# Patient Record
Sex: FEMALE
[De-identification: names, ages, dates, MRNs, and addresses within clinical notes are randomized per-mention and may not be internally consistent; named-entity substitution may affect disease eponyms.]

---

## 2022-07-22 PROBLEM — Z00.00 ENCOUNTER FOR PREVENTIVE HEALTH EXAMINATION: Status: ACTIVE | Noted: 2022-07-22

## 2022-10-31 ENCOUNTER — APPOINTMENT (OUTPATIENT)
Dept: RHEUMATOLOGY | Facility: CLINIC | Age: 52
End: 2022-10-31

## 2022-10-31 ENCOUNTER — LABORATORY RESULT (OUTPATIENT)
Age: 52
End: 2022-10-31

## 2022-10-31 ENCOUNTER — NON-APPOINTMENT (OUTPATIENT)
Age: 52
End: 2022-10-31

## 2022-10-31 VITALS
HEART RATE: 78 BPM | OXYGEN SATURATION: 96 % | TEMPERATURE: 97.6 F | SYSTOLIC BLOOD PRESSURE: 140 MMHG | DIASTOLIC BLOOD PRESSURE: 70 MMHG

## 2022-10-31 DIAGNOSIS — Z82.61 FAMILY HISTORY OF ARTHRITIS: ICD-10-CM

## 2022-10-31 DIAGNOSIS — Z63.5 DISRUPTION OF FAMILY BY SEPARATION AND DIVORCE: ICD-10-CM

## 2022-10-31 DIAGNOSIS — Z82.49 FAMILY HISTORY OF ISCHEMIC HEART DISEASE AND OTHER DISEASES OF THE CIRCULATORY SYSTEM: ICD-10-CM

## 2022-10-31 DIAGNOSIS — Z87.2 PERSONAL HISTORY OF DISEASES OF THE SKIN AND SUBCUTANEOUS TISSUE: ICD-10-CM
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
## 2022-10-31 DIAGNOSIS — Z78.9 OTHER SPECIFIED HEALTH STATUS: ICD-10-CM

## 2022-10-31 DIAGNOSIS — M19.90 UNSPECIFIED OSTEOARTHRITIS, UNSPECIFIED SITE: ICD-10-CM

## 2022-10-31 DIAGNOSIS — M25.50 PAIN IN UNSPECIFIED JOINT: ICD-10-CM

## 2022-10-31 DIAGNOSIS — Z86.69 PERSONAL HISTORY OF OTHER DISEASES OF THE NERVOUS SYSTEM AND SENSE ORGANS: ICD-10-CM

## 2022-10-31 DIAGNOSIS — Z83.3 FAMILY HISTORY OF DIABETES MELLITUS: ICD-10-CM

## 2022-10-31 DIAGNOSIS — Z80.0 FAMILY HISTORY OF MALIGNANT NEOPLASM OF DIGESTIVE ORGANS: ICD-10-CM

## 2022-10-31 PROCEDURE — 99205 OFFICE O/P NEW HI 60 MIN: CPT

## 2022-10-31 RX ORDER — CICLOPIROX OLAMINE 7.7 MG/ML
0.77 SUSPENSION TOPICAL
Qty: 60 | Refills: 0 | Status: DISCONTINUED | COMMUNITY
Start: 2022-04-01

## 2022-10-31 RX ORDER — METRONIDAZOLE 7.5 MG/G
0.75 CREAM TOPICAL
Qty: 45 | Refills: 0 | Status: DISCONTINUED | COMMUNITY
Start: 2022-04-01

## 2022-10-31 RX ORDER — TRIAMCINOLONE ACETONIDE 1 MG/G
0.1 CREAM TOPICAL
Qty: 80 | Refills: 0 | Status: ACTIVE | COMMUNITY
Start: 2022-04-01

## 2022-10-31 RX ORDER — FLUOCINONIDE 0.5 MG/ML
0.05 SOLUTION TOPICAL
Qty: 60 | Refills: 0 | Status: DISCONTINUED | COMMUNITY
Start: 2022-04-01

## 2022-10-31 RX ORDER — METHYLPREDNISOLONE 4 MG/1
4 TABLET ORAL
Qty: 21 | Refills: 0 | Status: DISCONTINUED | COMMUNITY
Start: 2022-06-30

## 2022-10-31 RX ORDER — TRIAMCINOLONE ACETONIDE 1 MG/G
0.1 OINTMENT TOPICAL
Refills: 0 | Status: DISCONTINUED | COMMUNITY

## 2022-10-31 RX ORDER — METRONIDAZOLE 375 MG/1
375 CAPSULE ORAL
Qty: 30 | Refills: 0 | Status: DISCONTINUED | COMMUNITY
Start: 2022-05-18

## 2022-10-31 RX ORDER — FLUCONAZOLE 150 MG/1
150 TABLET ORAL
Qty: 3 | Refills: 0 | Status: DISCONTINUED | COMMUNITY
Start: 2022-05-31

## 2022-10-31 RX ORDER — BUTALBITAL, ACETAMINOPHEN, AND CAFFEINE 50; 300; 40 MG/1; MG/1; MG/1
CAPSULE ORAL
Refills: 0 | Status: DISCONTINUED | COMMUNITY

## 2022-10-31 RX ORDER — TRAZODONE HYDROCHLORIDE 50 MG/1
50 TABLET ORAL
Qty: 30 | Refills: 0 | Status: DISCONTINUED | COMMUNITY
Start: 2022-10-20

## 2022-10-31 RX ORDER — AMOXICILLIN 500 MG/1
500 CAPSULE ORAL
Qty: 30 | Refills: 0 | Status: DISCONTINUED | COMMUNITY
Start: 2022-05-18

## 2022-10-31 SDOH — SOCIAL STABILITY - SOCIAL INSECURITY: DISRUPTION OF FAMILY BY SEPARATION AND DIVORCE: Z63.5

## 2022-11-01 ENCOUNTER — TRANSCRIPTION ENCOUNTER (OUTPATIENT)
Age: 52
End: 2022-11-01

## 2022-11-05 LAB
A PHAGOCYTOPH IGG TITR SER IF: NORMAL TITER
ACE BLD-CCNC: 53 U/L
ALBUMIN SERPL ELPH-MCNC: 5.3 G/DL
ALP BLD-CCNC: 82 U/L
ALT SERPL-CCNC: 27 U/L
ANA SER IF-ACNC: NEGATIVE
ANION GAP SERPL CALC-SCNC: 12 MMOL/L
APPEARANCE: CLEAR
AST SERPL-CCNC: 24 U/L
B BURGDOR AB SER QL IA: NEGATIVE
B MICROTI IGG TITR SER: NORMAL TITER
BACTERIA: NEGATIVE
BASOPHILS # BLD AUTO: 0.06 K/UL
BASOPHILS NFR BLD AUTO: 0.9 %
BILIRUB SERPL-MCNC: 0.2 MG/DL
BILIRUBIN URINE: NEGATIVE
BLOOD URINE: NEGATIVE
BUN SERPL-MCNC: 22 MG/DL
CALCIUM SERPL-MCNC: 10.1 MG/DL
CCP AB SER IA-ACNC: <8 UNITS
CHLORIDE SERPL-SCNC: 101 MMOL/L
CK SERPL-CCNC: 200 U/L
CO2 SERPL-SCNC: 26 MMOL/L
COLOR: NORMAL
CREAT SERPL-MCNC: 0.86 MG/DL
CRP SERPL-MCNC: <3 MG/L
DSDNA AB SER-ACNC: 25 IU/ML
E CHAFFEENSIS IGG TITR SER IF: NORMAL TITER
EGFR: 82 ML/MIN/1.73M2
ENA SS-A AB SER IA-ACNC: <0.2 AL
ENA SS-B AB SER IA-ACNC: <0.2 AL
ENDOMYSIUM IGA SER QL: NEGATIVE
ENDOMYSIUM IGA TITR SER: NORMAL
EOSINOPHIL # BLD AUTO: 0.26 K/UL
EOSINOPHIL NFR BLD AUTO: 3.7 %
ERYTHROCYTE [SEDIMENTATION RATE] IN BLOOD BY WESTERGREN METHOD: 3 MM/HR
GLIADIN IGA SER QL: <5 UNITS
GLIADIN IGG SER QL: <5 UNITS
GLIADIN PEPTIDE IGA SER-ACNC: NEGATIVE
GLIADIN PEPTIDE IGG SER-ACNC: NEGATIVE
GLUCOSE QUALITATIVE U: NEGATIVE
GLUCOSE SERPL-MCNC: 93 MG/DL
HAV IGM SER QL: NONREACTIVE
HBV CORE IGG+IGM SER QL: NONREACTIVE
HBV CORE IGM SER QL: NONREACTIVE
HBV SURFACE AG SER QL: NONREACTIVE
HCT VFR BLD CALC: 44.1 %
HCV AB SER QL: NONREACTIVE
HCV S/CO RATIO: 0.24 S/CO
HGB BLD-MCNC: 14.1 G/DL
HYALINE CASTS: 0 /LPF
IGG SUBSET TOTAL IGG: 1020 MG/DL
IGG1 SER-MCNC: 626 MG/DL
IGG2 SER-MCNC: 254 MG/DL
IGG3 SER-MCNC: 45 MG/DL
IGG4 SER-MCNC: 9 MG/DL
IMM GRANULOCYTES NFR BLD AUTO: 0.3 %
KETONES URINE: NEGATIVE
LDH SERPL-CCNC: 161 U/L
LEUKOCYTE ESTERASE URINE: NEGATIVE
LYMPHOCYTES # BLD AUTO: 1.54 K/UL
LYMPHOCYTES NFR BLD AUTO: 22 %
MAGNESIUM SERPL-MCNC: 2 MG/DL
MAN DIFF?: NORMAL
MCHC RBC-ENTMCNC: 31.3 PG
MCHC RBC-ENTMCNC: 32 GM/DL
MCV RBC AUTO: 98 FL
MICROSCOPIC-UA: NORMAL
MONOCYTES # BLD AUTO: 0.38 K/UL
MONOCYTES NFR BLD AUTO: 5.4 %
NEUTROPHILS # BLD AUTO: 4.74 K/UL
NEUTROPHILS NFR BLD AUTO: 67.7 %
NITRITE URINE: NEGATIVE
PH URINE: 6.5
PHOSPHATE SERPL-MCNC: 4.4 MG/DL
PLATELET # BLD AUTO: 235 K/UL
POTASSIUM SERPL-SCNC: 4.3 MMOL/L
PROT SERPL-MCNC: 7.4 G/DL
PROTEIN URINE: NEGATIVE
RBC # BLD: 4.5 M/UL
RBC # FLD: 12.4 %
RED BLOOD CELLS URINE: 1 /HPF
RF+CCP IGG SER-IMP: NEGATIVE
RHEUMATOID FACT SER QL: 17 IU/ML
SODIUM SERPL-SCNC: 139 MMOL/L
SPECIFIC GRAVITY URINE: 1.01
SQUAMOUS EPITHELIAL CELLS: 3 /HPF
T3 SERPL-MCNC: 72 NG/DL
T3RU NFR SERPL: 1 TBI
T4 SERPL-MCNC: 5.9 UG/DL
THYROGLOB AB SERPL-ACNC: <20 IU/ML
THYROPEROXIDASE AB SERPL IA-ACNC: <10 IU/ML
TSH SERPL-ACNC: 1.92 UIU/ML
TTG IGA SER IA-ACNC: 2 U/ML
TTG IGA SER-ACNC: NEGATIVE
TTG IGG SER IA-ACNC: <1.2 U/ML
TTG IGG SER IA-ACNC: NEGATIVE
URATE SERPL-MCNC: 6 MG/DL
UROBILINOGEN URINE: NORMAL
WBC # FLD AUTO: 7 K/UL
WHITE BLOOD CELLS URINE: 0 /HPF

## 2022-11-10 LAB
ALBUMIN MFR SERPL ELPH: 64.7 %
ALBUMIN SERPL-MCNC: 4.8 G/DL
ALBUMIN/GLOB SERPL: 1.8 RATIO
ALPHA1 GLOB MFR SERPL ELPH: 3.5 %
ALPHA1 GLOB SERPL ELPH-MCNC: 0.3 G/DL
ALPHA2 GLOB MFR SERPL ELPH: 8.3 %
ALPHA2 GLOB SERPL ELPH-MCNC: 0.6 G/DL
B-GLOBULIN MFR SERPL ELPH: 9.4 %
B-GLOBULIN SERPL ELPH-MCNC: 0.7 G/DL
DEPRECATED KAPPA LC FREE/LAMBDA SER: 1.39 RATIO
GAMMA GLOB FLD ELPH-MCNC: 1 G/DL
GAMMA GLOB MFR SERPL ELPH: 14.1 %
IGA SER QL IEP: 96 MG/DL
IGG SER QL IEP: 1023 MG/DL
IGM SER QL IEP: 195 MG/DL
INTERPRETATION SERPL IEP-IMP: NORMAL
KAPPA LC CSF-MCNC: 1.14 MG/DL
KAPPA LC SERPL-MCNC: 1.59 MG/DL
M PROTEIN SPEC IFE-MCNC: NORMAL
PROT SERPL-MCNC: 7.4 G/DL
PROT SERPL-MCNC: 7.4 G/DL

## 2022-11-13 ENCOUNTER — RX CHANGE (OUTPATIENT)
Age: 52
End: 2022-11-13

## 2022-11-14 PROBLEM — Z86.69 HISTORY OF MIGRAINE HEADACHES: Status: RESOLVED | Noted: 2022-11-14 | Resolved: 2022-11-14

## 2022-11-14 PROBLEM — Z87.2 HISTORY OF ECZEMA: Status: RESOLVED | Noted: 2022-11-14 | Resolved: 2022-11-14
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
## 2022-11-14 PROBLEM — Z78.9: Status: RESOLVED | Noted: 2022-11-14 | Resolved: 2022-11-14

## 2022-11-14 PROBLEM — Z63.5 DIVORCED: Status: ACTIVE | Noted: 2022-11-14

## 2022-11-14 PROBLEM — Z78.9 ALCOHOL USE: Status: ACTIVE | Noted: 2022-11-14

## 2022-11-15 NOTE — HISTORY OF PRESENT ILLNESS
[FreeTextEntry1] : DOUG HENDRICKS is a 52 year old  woman who was referred here for further evaluation of joint symptoms and rheumatic diseases.\par \par 3 months ago, patient had persistent low back pain with radiation to the left lower extremity to the toes with paresthesias with prolonged sitting, walking or when lying supine. She also had pain in bilateral shoulders, elbows, wrists, PIPs, hips, knees, ankles, balls of both feet, soles of both feet and dorsa both feet. Denies joint swelling, erythema and heat. Pain worse in the morning. Morning stiffness lasts all day. Denies trauma or injury. Pain management without diagnosis or treatment. Today, she has dry mouth but denies dry eyes, dry skin or vaginal dryness. Much sleep disturbance and fatigue with about 6-8 hours of sleep a night. 12 years history of similar symptoms. Pain management treated with multiple epidural steroid injections without much relief. 2013 - tick bite without rash but treated with antibiotics as a precaution. In the past, treated Meloxicam 15 mg q.d. without relief. Flexeril 10 mg or 20 mg q.d. h.s. without relief and side effects (grogginess and dryness). Mother and father with arthritis of unknown type. Pain Management Dr. Sommers referred patient here for further evaluation.

## 2022-11-15 NOTE — ADDENDUM
[FreeTextEntry1] : I, Yany Chacon, acted solely as a scribe for Dr. Myron I. Kleiner, MD. on 10/31/2022.

## 2022-11-15 NOTE — REVIEW OF SYSTEMS
[Negative] : Heme/Lymph [Feeling Tired] : feeling tired [Arthralgias] : arthralgias [Joint Pain] : joint pain [Joint Stiffness] : joint stiffness [As Noted in HPI] : as noted in HPI [Dry Eyes] : no dryness of the eyes

## 2022-11-15 NOTE — CONSULT LETTER
[Consult Letter:] : I had the pleasure of evaluating your patient, [unfilled]. [Please see my note below.] : Please see my note below. [Consult Closing:] : Thank you very much for allowing me to participate in the care of this patient.  If you have any questions, please do not hesitate to contact me. [Sincerely,] : Sincerely, [DrDesi  ___] : Dr. LIN [Dear  ___] : Dear  [unfilled], [FreeTextEntry3] : Ankit\par Myron I. Kleiner, M.D., FACR\par Chief, Division of Rheumatology\par Department of Medicine\par [facility]

## 2022-11-15 NOTE — ASSESSMENT
[FreeTextEntry1] : Impression: DOUG HENDRICKS is a 52 year old  woman who was referred here for further evaluation of joint symptoms and rheumatic diseases.\par \par 3 month history of persistent low back pain with radiation to the left lower extremity to the toes with paresthesias. I will evaluate for various types of rheumatic diseases, including various inflammatory types of arthritis. She also had persistent arthralgias in other various joints. Today, she has dry eyes, although not bothersome and dry mouth but denies dry skin or vaginal dryness - consider Sjogren's Syndrome and evaluate for sarcoidosis, hepatitis C, IgG 4 related disease and other related diseases. Much sleep disturbance and fatigue secondary to fibromyalgia which is contributing to her joint pain. 12 years history of similar symptoms - Pain management treated low back pain with multiple epidural steroid injections without much relief. In the past, treated Meloxicam 15 mg q.d. without relief. Flexeril 10 mg or 20 mg q.d. h.s. without relief and side effects (grogginess and dryness). Mother and father with arthritis of unknown type. Pain Management referred patient here for further evaluation.\par \par Plan: \par Laboratory tests ordered - see list below - with coordination of care\par X-rays ordered - see list below - with coordination of care\par Diagnosis and prognosis discussed\par Continue current medications (other than those changed below)\par Etodolac  mg b.i.d. end of breakfast and supper (Possible side effects explained including cardiovascular risk/MI/CVA) \par Prophylactic aspirin 81 mg q.d. at end of lunch (Possible side effects explained) \par Duloxetine  30 mg q.d. suppertime, if no better/side effects x7 days increase 60 mg q.d. suppertime (possible side effects explained) \par Artificial tears one drop each eye q.i.d. and p.r.n.(Possible side effects explained)\par Biotene mouthwash/spray q.i.d. and p.r.n.(Possible side effects explained) \par Oral Hydration\par Patient declines oral medication for dryness\par Daily exercise starting at 10 minutes per day, gradually increasing to at least 30 minutes per day - emphasized \par Sleep Study - [facility] - patient declined \par Return visit 2-3 weeks

## 2022-12-09 ENCOUNTER — APPOINTMENT (OUTPATIENT)
Dept: RHEUMATOLOGY | Facility: CLINIC | Age: 52
End: 2022-12-09
Payer: COMMERCIAL

## 2022-12-09 VITALS
TEMPERATURE: 98 F | HEART RATE: 81 BPM | HEIGHT: 64 IN | OXYGEN SATURATION: 97 % | SYSTOLIC BLOOD PRESSURE: 120 MMHG | DIASTOLIC BLOOD PRESSURE: 84 MMHG | RESPIRATION RATE: 17 BRPM | BODY MASS INDEX: 25.61 KG/M2 | WEIGHT: 150 LBS

## 2022-12-09 DIAGNOSIS — G89.29 PAIN IN UNSPECIFIED ELBOW: ICD-10-CM

## 2022-12-09 DIAGNOSIS — M25.529 PAIN IN UNSPECIFIED ELBOW: ICD-10-CM

## 2022-12-09 PROCEDURE — 99215 OFFICE O/P EST HI 40 MIN: CPT | Mod: 25

## 2022-12-09 PROCEDURE — 99417 PROLNG OP E/M EACH 15 MIN: CPT

## 2022-12-09 PROCEDURE — G2212 PROLONG OUTPT/OFFICE VIS: CPT

## 2022-12-09 RX ORDER — DULOXETINE HYDROCHLORIDE 30 MG/1
30 CAPSULE, DELAYED RELEASE PELLETS ORAL
Qty: 30 | Refills: 1 | Status: DISCONTINUED | COMMUNITY
Start: 2022-10-31 | End: 2022-12-09

## 2022-12-09 RX ORDER — ASPIRIN ENTERIC COATED TABLETS 81 MG 81 MG/1
81 TABLET, DELAYED RELEASE ORAL
Qty: 90 | Refills: 0 | Status: DISCONTINUED | COMMUNITY
Start: 2022-10-31 | End: 2022-12-09

## 2022-12-09 RX ORDER — ETODOLAC 400 MG/1
400 TABLET, FILM COATED, EXTENDED RELEASE ORAL
Qty: 60 | Refills: 1 | Status: DISCONTINUED | COMMUNITY
Start: 2022-10-31 | End: 2022-12-09

## 2022-12-09 NOTE — REVIEW OF SYSTEMS
[Feeling Tired] : feeling tired [Arthralgias] : arthralgias [Joint Pain] : joint pain [Joint Stiffness] : joint stiffness [Negative] : Heme/Lymph [As Noted in HPI] : as noted in HPI [Dry Eyes] : dryness of the eyes

## 2022-12-11 ENCOUNTER — RX CHANGE (OUTPATIENT)
Age: 52
End: 2022-12-11

## 2022-12-11 NOTE — HISTORY OF PRESENT ILLNESS
[FreeTextEntry1] : DOUG HENDRICKS is a 52 year old woman who presents for an initial follow up for further evaluation of joint symptoms and rheumatic diseases. \par \par Patient feels fairly well. She has persistent pain low back without radiation and lateral aspect left thigh with paresthesias left foot and toes.  Also some pain bilateral elbows, wrists, knees. She forgot to take the prophylactic aspirin as recommended. Much sleep disturbance and fatigue.  Also snoring.  She exercises daily by walking 35 to 40 minutes.  Some dry eyes and dry mouth, using artificial tears, oral hydration and biotene mouthwash with transient relief. Discontinued Duloxetine after 2 days secondary to allergic reaction (diffuse erythema). Gabapentin and Lyrica in the past without relief. Patient denies rash or side effects with other current medications. Patient is content with current medication regimen.

## 2022-12-11 NOTE — CONSULT LETTER
[Dear  ___] : Dear  [unfilled], [Consult Letter:] : I had the pleasure of evaluating your patient, [unfilled]. [Please see my note below.] : Please see my note below. [Consult Closing:] : Thank you very much for allowing me to participate in the care of this patient.  If you have any questions, please do not hesitate to contact me. [Sincerely,] : Sincerely, [DrDesi  ___] : Dr. LIN [FreeTextEntry3] : Ankit\par Myron I. Kleiner, M.D., FACR\par Chief, Division of Rheumatology\par Department of Medicine\par [facility]

## 2022-12-11 NOTE — ADDENDUM
[FreeTextEntry1] : I, Yany Chacon, acted solely as a scribe for Dr. Myron I. Kleiner, MD. on 12/09/2022.

## 2022-12-11 NOTE — ASSESSMENT
[FreeTextEntry1] : Impression: DOUG HENDRICKS is a 52 year old woman who presents for an initial follow up for further evaluation of joint symptoms and rheumatic diseases including chronic low back pain, osteoarthritis, fibromyalgia and Sjogren's Syndrome.\par \par Patient feels fairly well, although she has persistent pain low back without radiation and lateral aspect left thigh with paresthesias left foot and toes secondary to her osteoarthritis, as seen on recent x-ray results - consider LS radiculopathy.  She also has some pain bilateral elbows, wrists, knees.  He etodolac is not giving her adequate relief.  Recent x-rays revealed osteoarthritis of bilateral hips and LS spine, otherwise normal.  Much sleep disturbance and fatigue consistent with fibromyalgia which is also contributing to her persistent joint pain. On exam, patient has bilateral medial epicondylitis, greater trochanteric bursitis and anserine bursitis contributing to her joint pain. Some dry eyes and dry mouth, using artificial tears, oral hydration and biotene mouthwash with transient relief -consistent with Sjogren's Syndrome. Recent lab tests revealed no significant results or changes. Discontinued Duloxetine after 2 days secondary to allergic reaction (diffuse erythema). Gabapentin and Lyrica in the past without relief. Patient denies rash or side effects with other current medications. Patient is content with current medication regimen. \par \par Plan: I reviewed recent lab results with patient with extensive discussion\par I reviewed recent X-ray results with patient with extensive discussion\par Laboratory tests ordered - see list below - with coordination of care \par Diagnosis and prognosis discussed\par Continue current medications (other than those changed below)\par Stay off Duloxetine\par Discontinue Etodolac\par Flurbiprofen 100 mg t.i.d. end of meals (Possible side effects explained including cardiovascular risk/MI/CVA) \par Start Prophylactic Aspirin 81 mg q.d. with a snack between 2 meals (Possible side effects explained) \par Savella Titration Pack, increasing up to 50 mg b.i.d end of meals thereafter (Possible side effects explained) \par If Savella is not covered by health insurance, Lyrica 25 mg b.i.d.; if no better/side effects 7 days, increase to 50 mg b.i.d.; if no better/side effects after another 7 days, increase to 75 mg b.i.d. (possible side effects explained)--although she has had it in the past, we would try again\par Pilocarpine 5 mg b.i.d.; if no better/no side effects 7 days, increase t.i.d.(Possible side effects explained)  \par Artificial tears one drop each eye q.i.d. and p.r.n.(Possible side effects explained)\par Gen-Teal "severe" one drop each eye q.i.d. p.r.n. (Possible side effects explained)\par Biotene mouthwash/spray q.i.d. and p.r.n.(Possible side effects explained) \par Oral Hydration\par Continue Daily exercise  at least 30 minutes per day - emphasized--extensive discussion\par Consider Sleep Study - [facility] - patient declined again\par Return visit 3 months \par Total time for this office visit, including face-to-face time and non-face-to-face time, 85 minutes--- including review of the chart and previous records, review of previous lab results with extensive discussion with the patient, ordering lab tests with coordination of care, review of recent imaging reports/x-ray results with extensive discussion with the patient, discussion regarding need for sleep study but patient declined again, detailed medication history, review of medications going forward with their possible side effects, reviewed the impact of the patient's rheumatic disease on their other medical problems, reviewed the impact of the patient's other medical problems on their rheumatic disease

## 2023-02-09 ENCOUNTER — RX RENEWAL (OUTPATIENT)
Age: 53
End: 2023-02-09

## 2023-02-10 ENCOUNTER — TRANSCRIPTION ENCOUNTER (OUTPATIENT)
Age: 53
End: 2023-02-10

## 2023-03-10 ENCOUNTER — RX RENEWAL (OUTPATIENT)
Age: 53
End: 2023-03-10

## 2023-03-13 ENCOUNTER — APPOINTMENT (OUTPATIENT)
Dept: RHEUMATOLOGY | Facility: CLINIC | Age: 53
End: 2023-03-13
Payer: COMMERCIAL

## 2023-03-13 VITALS
TEMPERATURE: 98.1 F | SYSTOLIC BLOOD PRESSURE: 120 MMHG | OXYGEN SATURATION: 97 % | HEIGHT: 64 IN | DIASTOLIC BLOOD PRESSURE: 82 MMHG | HEART RATE: 85 BPM

## 2023-03-13 DIAGNOSIS — R53.83 OTHER FATIGUE: ICD-10-CM

## 2023-03-13 PROCEDURE — 81003 URINALYSIS AUTO W/O SCOPE: CPT | Mod: QW

## 2023-03-13 PROCEDURE — 36415 COLL VENOUS BLD VENIPUNCTURE: CPT

## 2023-03-13 PROCEDURE — 99417 PROLNG OP E/M EACH 15 MIN: CPT | Mod: 25

## 2023-03-13 PROCEDURE — 99215 OFFICE O/P EST HI 40 MIN: CPT | Mod: 25

## 2023-03-13 RX ORDER — MILNACIPRAN HYDROCHLORIDE 12.5-25-5
12.5 & 25 & 5 KIT ORAL
Qty: 1 | Refills: 0 | Status: DISCONTINUED | COMMUNITY
Start: 2022-12-09 | End: 2023-03-13

## 2023-03-13 RX ORDER — TRAZODONE HYDROCHLORIDE 50 MG/1
50 TABLET ORAL
Refills: 0 | Status: DISCONTINUED | COMMUNITY
End: 2023-03-13

## 2023-03-13 RX ORDER — MILNACIPRAN HYDROCHLORIDE 50 MG/1
50 TABLET, FILM COATED ORAL
Qty: 180 | Refills: 0 | Status: DISCONTINUED | COMMUNITY
Start: 2022-12-09 | End: 2023-03-13

## 2023-03-13 NOTE — REVIEW OF SYSTEMS
[Feeling Tired] : feeling tired [Dry Eyes] : dryness of the eyes [Arthralgias] : arthralgias [Joint Pain] : joint pain [Joint Stiffness] : joint stiffness [As Noted in HPI] : as noted in HPI [Negative] : Heme/Lymph

## 2023-03-14 LAB
ALBUMIN SERPL ELPH-MCNC: 4.9 G/DL
ALP BLD-CCNC: 92 U/L
ALT SERPL-CCNC: 40 U/L
ANION GAP SERPL CALC-SCNC: 13 MMOL/L
AST SERPL-CCNC: 23 U/L
BASOPHILS # BLD AUTO: 0.05 K/UL
BASOPHILS NFR BLD AUTO: 0.7 %
BILIRUB SERPL-MCNC: 0.3 MG/DL
BILIRUB UR QL STRIP: NORMAL
BUN SERPL-MCNC: 20 MG/DL
CALCIUM SERPL-MCNC: 9.9 MG/DL
CHLORIDE SERPL-SCNC: 104 MMOL/L
CK SERPL-CCNC: 131 U/L
CLARITY UR: CLEAR
CO2 SERPL-SCNC: 25 MMOL/L
COLLECTION METHOD: NORMAL
CREAT SERPL-MCNC: 0.63 MG/DL
CRP SERPL-MCNC: <3 MG/L
EGFR: 107 ML/MIN/1.73M2
EOSINOPHIL # BLD AUTO: 0.11 K/UL
EOSINOPHIL NFR BLD AUTO: 1.5 %
GLUCOSE SERPL-MCNC: 97 MG/DL
GLUCOSE UR-MCNC: NORMAL
HCG UR QL: 0.2 EU/DL
HCT VFR BLD CALC: 41.1 %
HGB BLD-MCNC: 13.1 G/DL
HGB UR QL STRIP.AUTO: NORMAL
IMM GRANULOCYTES NFR BLD AUTO: 0.3 %
KETONES UR-MCNC: NORMAL
LDH SERPL-CCNC: 157 U/L
LEUKOCYTE ESTERASE UR QL STRIP: NORMAL
LYMPHOCYTES # BLD AUTO: 1.49 K/UL
LYMPHOCYTES NFR BLD AUTO: 20.1 %
MAN DIFF?: NORMAL
MCHC RBC-ENTMCNC: 30.3 PG
MCHC RBC-ENTMCNC: 31.9 GM/DL
MCV RBC AUTO: 94.9 FL
MONOCYTES # BLD AUTO: 0.32 K/UL
MONOCYTES NFR BLD AUTO: 4.3 %
NEUTROPHILS # BLD AUTO: 5.41 K/UL
NEUTROPHILS NFR BLD AUTO: 73.1 %
NITRITE UR QL STRIP: NORMAL
PH UR STRIP: 6
PHOSPHATE SERPL-MCNC: 3.6 MG/DL
PLATELET # BLD AUTO: 252 K/UL
POTASSIUM SERPL-SCNC: 4.4 MMOL/L
PROT SERPL-MCNC: 7.1 G/DL
PROT UR STRIP-MCNC: NORMAL
RBC # BLD: 4.33 M/UL
RBC # FLD: 13.3 %
SODIUM SERPL-SCNC: 141 MMOL/L
SP GR UR STRIP: 1.01
WBC # FLD AUTO: 7.4 K/UL

## 2023-03-14 RX ORDER — ASPIRIN ENTERIC COATED TABLETS 81 MG 81 MG/1
81 TABLET, DELAYED RELEASE ORAL
Qty: 90 | Refills: 0 | Status: ACTIVE | COMMUNITY
Start: 2022-12-09

## 2023-03-14 NOTE — CONSULT LETTER
[Consult Letter:] : I had the pleasure of evaluating your patient, [unfilled]. [Please see my note below.] : Please see my note below. [Consult Closing:] : Thank you very much for allowing me to participate in the care of this patient.  If you have any questions, please do not hesitate to contact me. [Sincerely,] : Sincerely, [Dear  ___] : Dear  [unfilled], [FreeTextEntry3] : Ankit\par Myron I. Kleiner, M.D., FACR\par Chief, Division of Rheumatology\par Department of Medicine\par [facility] [DrDesi  ___] : Dr. LIN

## 2023-03-14 NOTE — ASSESSMENT
[FreeTextEntry1] : Impression: DOUG HENDRICKS is a 52 year old woman who presents for an initial follow up for further evaluation of joint symptoms and rheumatic diseases including chronic low back pain, osteoarthritis, fibromyalgia and Sjogren's Syndrome.\par \par Patient feels fairly well. Has some persistent pain bilateral elbows, knees, PIPs. Low back pain with radiation into bilateral lower extremities to the toes secondary to combination of osteoarthritis, fibromyalgia, and chronic low back pain.. . Some sleep disturbance and fatigue secondary to fibromyalgia. Dry eyes, dry mouth, and dry skin secondary to Sjogren's syndrome, using artificial tears, biotene mouthwash, pilocarpine (on her own increased to 4-5x a day), and  oral hydration with transient relief. Has reported that flurbiprofen has not given adequate relief. Increased exercise everyday for an hour including treadmill, walking, weights. Patient denies rash or side effects with current medications. Patient is content with current medication regimen. \par \par \par Plan: I reviewed  chart and previous records \par I reviewed recent lab results with patient with extensive discussion\par I reviewed recent X-ray results with patient with extensive discussion\par Laboratory tests ordered - see list below - with coordination of care \par Diagnosis and prognosis discussed\par Continue current medications (other than those changed below)\par Discontinue flurbiprofen \par Nabumetone 1,000 mg b.i.d. end of breakfast and supper (Possible side effects explained including cardiovascular risk/MI/CVA) \par Change timing of prophylactic aspirin 81 mg daily at end of lunch (Possible side effects explained)\par Lyrica 25 mg b.i.d.; if no better/side effects after another 7 days, increase to 50 mg b.i.d.; if no better/side effects after another 7 days, increase to 75 mg twice daily (possible side effects explained\par After baseline ophthalmology evaluation including OCT/visual fields, start Plaquenil 200 mg b.i.d  (possible side effects explained)--send consultation report\par After starting Plaquenil, CBC/platelet count every 2 weeks until her next office visit with me\par Ophthalmology Plaquenil monitoring every 6 months--emphasized\par Artificial tears one drop each eye q.i.d. and p.r.n.(Possible side effects explained)\par Gen-Teal "severe" one drop each eye q.i.d. p.r.n. (Possible side effects explained)\par Biotene mouthwash/spray q.i.d. and p.r.n.(Possible side effects explained) \par Oral Hydration\par Continue Daily exercise  at least 30 minutes per day - emphasized--extensive discussion\par Follow-up PCP regarding vertigo\par Return visit 3 months\par Total time for this office visit, including face-to-face time and non-face-to-face time, 70 minutes--- including review of the chart and previous records, review of previous lab results with extensive discussion with the patient, ordering lab tests with coordination of care, review of recent imaging reports/x-ray results, detailed medication history, review of medications going forward with their possible side effects

## 2023-03-14 NOTE — HISTORY OF PRESENT ILLNESS
[FreeTextEntry1] : DOUG HENDRICKS is a 52 year old woman who presents for follow up for further evaluation of joint symptoms and rheumatic diseases, including osteoarthritis, Sjogren's syndrome, fibromyalgia, and chronic low back pain. \par \par Patient feels fairly well. Has some persistent pain bilateral elbows, knees, PIPs. Low back pain with radiation into bilateral lower extremities to the toes. Some sleep disturbance and fatigue. Dry eyes, dry mouth, dry skin, using artificial tears, biotene mouthwash, pilocarpine (on her own increased to 4-5x a day), and  oral hydration with transient relief. Has reported that flurbiprofen has not given adequate relief. Increased exercise everyday for an hour including walking, treadmill, weights. Savella was not covered by insurance, however, she never followed-up on changing to alternative due to forgetting. Patient denies rash or side effects with current medications. Patient is content with current medication regimen. \par \par PMH\par Has experienced vertigo--claims its due to dehydration-- last episode was 3 days ago

## 2023-03-14 NOTE — ADDENDUM
[FreeTextEntry1] : I, Marquise Roldana, acted solely as a scribe for Dr. Myron I. Kleiner, MD. on 03/13/2023 .

## 2023-03-24 LAB
ENA SS-A AB SER IA-ACNC: <0.2 AL
ENA SS-B AB SER IA-ACNC: <0.2 AL
ERYTHROCYTE [SEDIMENTATION RATE] IN BLOOD BY WESTERGREN METHOD: 19 MM/HR
G6PD SER-CCNC: 14.6 U/G HGB
M TB IFN-G BLD-IMP: NEGATIVE
QUANTIFERON TB PLUS MITOGEN MINUS NIL: >10 IU/ML
QUANTIFERON TB PLUS NIL: 0.07 IU/ML
QUANTIFERON TB PLUS TB1 MINUS NIL: -0.03 IU/ML
QUANTIFERON TB PLUS TB2 MINUS NIL: -0.02 IU/ML

## 2023-05-17 ENCOUNTER — TRANSCRIPTION ENCOUNTER (OUTPATIENT)
Age: 53
End: 2023-05-17

## 2023-06-13 ENCOUNTER — APPOINTMENT (OUTPATIENT)
Dept: RHEUMATOLOGY | Facility: CLINIC | Age: 53
End: 2023-06-13
Payer: COMMERCIAL

## 2023-06-13 ENCOUNTER — RX RENEWAL (OUTPATIENT)
Age: 53
End: 2023-06-13

## 2023-06-13 VITALS
OXYGEN SATURATION: 98 % | HEIGHT: 64 IN | SYSTOLIC BLOOD PRESSURE: 122 MMHG | HEART RATE: 86 BPM | TEMPERATURE: 98.3 F | DIASTOLIC BLOOD PRESSURE: 78 MMHG

## 2023-06-13 DIAGNOSIS — G89.29 PAIN IN LEFT HIP: ICD-10-CM

## 2023-06-13 DIAGNOSIS — M25.552 PAIN IN LEFT HIP: ICD-10-CM

## 2023-06-13 PROCEDURE — 36415 COLL VENOUS BLD VENIPUNCTURE: CPT

## 2023-06-13 PROCEDURE — 81003 URINALYSIS AUTO W/O SCOPE: CPT | Mod: QW

## 2023-06-13 PROCEDURE — 99215 OFFICE O/P EST HI 40 MIN: CPT | Mod: 25

## 2023-06-13 RX ORDER — HYDROXYCHLOROQUINE SULFATE 200 MG/1
200 TABLET, FILM COATED ORAL
Qty: 180 | Refills: 0 | Status: DISCONTINUED | COMMUNITY
Start: 2023-03-14 | End: 2023-06-13

## 2023-06-13 RX ORDER — BUTALBITAL, ACETAMINOPHEN AND CAFFEINE 50; 325; 40 MG/1; MG/1; MG/1
50-325-40 CAPSULE ORAL
Qty: 30 | Refills: 0 | Status: DISCONTINUED | COMMUNITY
Start: 2022-10-20 | End: 2023-06-13

## 2023-06-13 RX ORDER — PREGABALIN 25 MG/1
25 CAPSULE ORAL
Qty: 60 | Refills: 3 | Status: DISCONTINUED | COMMUNITY
Start: 2023-03-14 | End: 2023-06-13

## 2023-06-13 RX ORDER — NABUMETONE 500 MG/1
500 TABLET, FILM COATED ORAL
Qty: 360 | Refills: 0 | Status: DISCONTINUED | COMMUNITY
Start: 2023-03-14 | End: 2023-06-13

## 2023-06-17 LAB
BILIRUB UR QL STRIP: NORMAL
CLARITY UR: CLEAR
COLLECTION METHOD: NORMAL
GLUCOSE UR-MCNC: NORMAL
HCG UR QL: 0.2 EU/DL
HGB UR QL STRIP.AUTO: NORMAL
KETONES UR-MCNC: NORMAL
LEUKOCYTE ESTERASE UR QL STRIP: NORMAL
NITRITE UR QL STRIP: NORMAL
PH UR STRIP: 6
PROT UR STRIP-MCNC: NORMAL
SP GR UR STRIP: <=1.005

## 2023-06-17 RX ORDER — PROGESTERONE 200 MG/1
CAPSULE ORAL
Refills: 0 | Status: ACTIVE | COMMUNITY

## 2023-06-17 NOTE — REVIEW OF SYSTEMS
[Arthralgias] : arthralgias [Joint Pain] : joint pain [Joint Stiffness] : joint stiffness [As Noted in HPI] : as noted in HPI [Negative] : Heme/Lymph [Feeling Tired] : not feeling tired [Dry Eyes] : no dryness of the eyes

## 2023-06-17 NOTE — ASSESSMENT
[FreeTextEntry1] : Impression: DOUG HENDRICKS is a 53 year old woman who presents for an initial follow up for further evaluation of joint symptoms and rheumatic diseases including chronic low back pain, osteoarthritis, fibromyalgia and Sjogren's Syndrome.\par \par Patient feels fairly well. Some pain both elbows, left wrist, both hips and both knees, secondary to her osteoarthritis and fibromyalgia. On exam, she has bilateral medial epicondylitis and anserine bursitis contributing to her joint pains. Had increased pain left hip especially with walking and lying on affected side-- 2 weeks ago, Orthopedics treated local steroid injection for bursitis left hip with much relief. Also had steroid injection base of left thumb with relief. No recent dry eyes and dry mouth secondary to her Sjogren's syndrome, has not needed pilocarpine which she takes as needed. No sleep disturbance and fatigue from her fibromyalgia. Daily exercise for 30 to 60 minutes via exercise class and walking. Tried Nabumetone with not much relief-- 5 days ago, switched back to Flurbiprofen t.i.d. with more relief. Did not start Plaquenil secondary to fear of side effects. Patient denies rash or side effects with current medications. Patient is content with current medication regimen. Recent laboratory test results reveal no significant changes or results, with extensive discussion. \par \par Plan: I reviewed the patient's chart and previous records \par I reviewed recent lab results with patient with extensive discussion\par Laboratory tests ordered - see list below - with coordination of care\par Bone densitometry--ordered\par Diagnosis and prognosis discussed\par Continue current medications (other than those changed below)\par Change Flurbiprofen to alternative NSAID-- patient declined, she wants to continue it until next visit\par Tizanidine 2 mg b.i.d. morning and supper plus 4 mg h.s.(Possible side effects explained) \par If needed, increase Pilocarpine b.i.d.-t.i.d. (possible side effects explained) \par Artificial tears one drop each eye q.i.d. and p.r.n.(Possible side effects explained)\par Biotene mouthwash/spray q.i.d. and p.r.n.(Possible side effects explained) \par Oral Hydration\par Continue Daily exercise at least 30 minutes per day - emphasized--extensive discussion\par Sleep Study-- patient declined \par Urged to follow-up with PCP regarding vertigo/lightheadedness\par Return visit 3 months

## 2023-06-17 NOTE — HISTORY OF PRESENT ILLNESS
[FreeTextEntry1] : DOUG HENDRICKS is a 53 year old woman who presents for follow up for further evaluation of joint symptoms and rheumatic diseases, including osteoarthritis, Sjogren's syndrome, fibromyalgia, and chronic low back pain. \par \par Patient feels fairly well. Some pain both elbows, left wrist, both hips and both knees. Had increased pain left hip especially with walking and lying on affected side-- 2 weeks ago, Orthopedics treated local steroid injection for bursitis with much relief. Also had steroid injection base of left thumb with relief. No recent dry eyes and dry mouth, has not needed pilocarpine. No sleep disturbance and fatigue. Daily exercise for 30 to 60 minutes via exercise class and walking. Tried Nabumetone with not much relief-- 5 days ago, switched back to Flurbiprofen t.i.d. with more relief. Did not start Plaquenil secondary to fear of side effects. On her own, discontinued Lyrica secondary to facial/chest flushing. Patient denies rash or side effects with current medications. Patient is content with current medication regimen. \par \par PMH: \par Perimenopausal, started Progesterone\par Persistent vertigo/lightheadedness-- has not seen PCP\par \par SH:\par This summer, trips to [place] and [facility]

## 2023-06-17 NOTE — CONSULT LETTER
[Dear  ___] : Dear  [unfilled], [Consult Letter:] : I had the pleasure of evaluating your patient, [unfilled]. [Please see my note below.] : Please see my note below. [Consult Closing:] : Thank you very much for allowing me to participate in the care of this patient.  If you have any questions, please do not hesitate to contact me. [Sincerely,] : Sincerely, [DrDesi  ___] : Dr. LIN [FreeTextEntry3] : Ankit\par Myron I. Kleiner, M.D., FACR\par Chief, Division of Rheumatology\par Department of Medicine\par [facility]

## 2023-06-17 NOTE — ADDENDUM
[FreeTextEntry1] : I, Anderson Mayberry, acted solely as a scribe for Dr. Myron I. Kleiner, MD. on 06/13/2023.

## 2023-06-18 LAB
ALBUMIN SERPL ELPH-MCNC: 4.4 G/DL
ALP BLD-CCNC: 67 U/L
ALT SERPL-CCNC: 17 U/L
ANION GAP SERPL CALC-SCNC: 13 MMOL/L
AST SERPL-CCNC: 15 U/L
BILIRUB SERPL-MCNC: 0.2 MG/DL
BUN SERPL-MCNC: 19 MG/DL
CALCIUM SERPL-MCNC: 9.7 MG/DL
CCP AB SER IA-ACNC: <8 UNITS
CHLORIDE SERPL-SCNC: 104 MMOL/L
CK SERPL-CCNC: 52 U/L
CO2 SERPL-SCNC: 24 MMOL/L
CREAT SERPL-MCNC: 0.84 MG/DL
CRP SERPL-MCNC: <3 MG/L
EGFR: 84 ML/MIN/1.73M2
ENA SS-A AB SER IA-ACNC: <0.2 AL
ENA SS-B AB SER IA-ACNC: <0.2 AL
ERYTHROCYTE [SEDIMENTATION RATE] IN BLOOD BY WESTERGREN METHOD: 6 MM/HR
GLUCOSE SERPL-MCNC: 89 MG/DL
LDH SERPL-CCNC: 143 U/L
PHOSPHATE SERPL-MCNC: 4.1 MG/DL
POTASSIUM SERPL-SCNC: 4.9 MMOL/L
PROT SERPL-MCNC: 6.6 G/DL
RF+CCP IGG SER-IMP: NEGATIVE
RHEUMATOID FACT SER QL: 16 IU/ML
SODIUM SERPL-SCNC: 141 MMOL/L

## 2023-06-26 ENCOUNTER — NON-APPOINTMENT (OUTPATIENT)
Age: 53
End: 2023-06-26

## 2023-07-22 RX ORDER — CALCIUM 500 MG
500 TABLET ORAL
Qty: 90 | Refills: 3 | Status: ACTIVE | COMMUNITY
Start: 2023-07-22

## 2023-07-27 ENCOUNTER — NON-APPOINTMENT (OUTPATIENT)
Age: 53
End: 2023-07-27

## 2023-08-30 ENCOUNTER — TRANSCRIPTION ENCOUNTER (OUTPATIENT)
Age: 53
End: 2023-08-30

## 2023-08-30 RX ORDER — PILOCARPINE HYDROCHLORIDE 5 MG/1
5 TABLET, FILM COATED ORAL
Qty: 180 | Refills: 0 | Status: ACTIVE | COMMUNITY
Start: 2022-12-09 | End: 1900-01-01

## 2023-09-18 ENCOUNTER — APPOINTMENT (OUTPATIENT)
Dept: RHEUMATOLOGY | Facility: CLINIC | Age: 53
End: 2023-09-18

## 2023-11-04 ENCOUNTER — TRANSCRIPTION ENCOUNTER (OUTPATIENT)
Age: 53
End: 2023-11-04

## 2023-12-13 ENCOUNTER — APPOINTMENT (OUTPATIENT)
Dept: RHEUMATOLOGY | Facility: CLINIC | Age: 53
End: 2023-12-13
Payer: COMMERCIAL

## 2023-12-13 VITALS
BODY MASS INDEX: 23.05 KG/M2 | HEART RATE: 96 BPM | OXYGEN SATURATION: 98 % | DIASTOLIC BLOOD PRESSURE: 80 MMHG | WEIGHT: 135 LBS | TEMPERATURE: 98.1 F | HEIGHT: 64 IN | SYSTOLIC BLOOD PRESSURE: 124 MMHG | RESPIRATION RATE: 17 BRPM

## 2023-12-13 DIAGNOSIS — M35.00 SICCA SYNDROME, UNSPECIFIED: ICD-10-CM

## 2023-12-13 DIAGNOSIS — H04.123 DRY EYE SYNDROME OF BILATERAL LACRIMAL GLANDS: ICD-10-CM

## 2023-12-13 DIAGNOSIS — M70.61 TROCHANTERIC BURSITIS, RIGHT HIP: ICD-10-CM

## 2023-12-13 DIAGNOSIS — M25.562 PAIN IN RIGHT KNEE: ICD-10-CM

## 2023-12-13 DIAGNOSIS — M70.62 TROCHANTERIC BURSITIS, RIGHT HIP: ICD-10-CM

## 2023-12-13 DIAGNOSIS — R68.2 DRY MOUTH, UNSPECIFIED: ICD-10-CM

## 2023-12-13 DIAGNOSIS — Z79.1 LONG TERM (CURRENT) USE OF NON-STEROIDAL ANTI-INFLAMMATORIES (NSAID): ICD-10-CM

## 2023-12-13 DIAGNOSIS — G89.29 PAIN IN RIGHT KNEE: ICD-10-CM

## 2023-12-13 DIAGNOSIS — M79.7 FIBROMYALGIA: ICD-10-CM

## 2023-12-13 DIAGNOSIS — L85.3 XEROSIS CUTIS: ICD-10-CM

## 2023-12-13 DIAGNOSIS — G89.29 LUMBAGO WITH SCIATICA, LEFT SIDE: ICD-10-CM

## 2023-12-13 DIAGNOSIS — M77.02 MEDIAL EPICONDYLITIS, LEFT ELBOW: ICD-10-CM

## 2023-12-13 DIAGNOSIS — M15.9 POLYOSTEOARTHRITIS, UNSPECIFIED: ICD-10-CM

## 2023-12-13 DIAGNOSIS — M77.01 MEDIAL EPICONDYLITIS, RIGHT ELBOW: ICD-10-CM

## 2023-12-13 DIAGNOSIS — M85.80 OTHER SPECIFIED DISORDERS OF BONE DENSITY AND STRUCTURE, UNSPECIFIED SITE: ICD-10-CM

## 2023-12-13 DIAGNOSIS — M54.42 LUMBAGO WITH SCIATICA, LEFT SIDE: ICD-10-CM

## 2023-12-13 DIAGNOSIS — M25.561 PAIN IN RIGHT KNEE: ICD-10-CM

## 2023-12-13 PROCEDURE — 99417 PROLNG OP E/M EACH 15 MIN: CPT | Mod: 25

## 2023-12-13 PROCEDURE — 99215 OFFICE O/P EST HI 40 MIN: CPT | Mod: 25

## 2023-12-16 RX ORDER — SULINDAC 200 MG/1
200 TABLET ORAL
Qty: 180 | Refills: 0 | Status: ACTIVE | COMMUNITY
Start: 2023-12-16 | End: 1900-01-01

## 2023-12-16 RX ORDER — TIZANIDINE 2 MG/1
2 TABLET ORAL
Qty: 180 | Refills: 0 | Status: DISCONTINUED | COMMUNITY
Start: 2023-06-17 | End: 2023-12-16

## 2023-12-16 RX ORDER — VENLAFAXINE HYDROCHLORIDE 37.5 MG/1
37.5 CAPSULE, EXTENDED RELEASE ORAL
Qty: 90 | Refills: 0 | Status: ACTIVE | COMMUNITY
Start: 2023-12-16 | End: 1900-01-01

## 2023-12-16 RX ORDER — FLURBIPROFEN 100 MG
100 TABLET ORAL
Qty: 270 | Refills: 0 | Status: DISCONTINUED | COMMUNITY
Start: 2022-12-09 | End: 2023-12-16

## 2023-12-16 RX ORDER — CHOLECALCIFEROL (VITAMIN D3) 25 MCG
TABLET ORAL DAILY
Refills: 0 | Status: DISCONTINUED | COMMUNITY
End: 2023-12-16

## 2023-12-16 RX ORDER — ERGOCALCIFEROL 1.25 MG/1
1.25 MG CAPSULE, LIQUID FILLED ORAL
Qty: 12 | Refills: 0 | Status: DISCONTINUED | COMMUNITY
Start: 2023-07-22 | End: 2023-12-16

## 2023-12-16 NOTE — HISTORY OF PRESENT ILLNESS
[FreeTextEntry1] : DOUG HENDRICKS is a 53 year old woman who presents for follow up for further evaluation of joint symptoms and rheumatic diseases, including osteoarthritis, Sjogren's syndrome, fibromyalgia, and chronic low back pain.   Note: Patient last seen June 2023   Pain "all over" including bilateral shoulders, elbows, wrists, fingers, hips, and knees. Denies joint swelling, erythema and heat. Persistent low back pain radiating to the left lower extremity to the toes. Persistent left hip pain, with weightbearing and at rest- Orthopedic Dr. Celestin performed left hip PRP injection, 2 months ago, without relief - to tendinitis areas - local steroid injection to left trochanteric bursitis with transient relief x2 weeks.  - to have LS Spine MRI performed. Mentions sleep disturbance and fatigue. Exercises 4-5 times a week, for 30 minutes via walking. Mentions dry mouth, denies recent dry eyes, using Pilocarpine q.d p.r.n., biotene mouthwash and oral hydration, with adequate relief. The patient continues calcium supplement 1000 mg at bedtime. Did not start vitamin D supplement. Patient did not perform the labs that were during previous telephone call. Discontinued Flurbiprofen secondary to lack of relief. Patient denies rash or side effects with current medications.  [facility] - elevated BP - labs were performed  SH September 2023 - [place] trip  October 2023 - [place] trip

## 2023-12-16 NOTE — CONSULT LETTER
[Dear  ___] : Dear  [unfilled], [Consult Letter:] : I had the pleasure of evaluating your patient, [unfilled]. [Please see my note below.] : Please see my note below. [Consult Closing:] : Thank you very much for allowing me to participate in the care of this patient.  If you have any questions, please do not hesitate to contact me. [Sincerely,] : Sincerely, [DrDesi  ___] : Dr. LIN [FreeTextEntry3] : Ankit\par  Myron I. Kleiner, M.D., FACR\par  Chief, Division of Rheumatology\par  Department of Medicine\par  [facility]

## 2023-12-16 NOTE — ASSESSMENT
[FreeTextEntry1] : Impression: DOUG HENDRICKS is a 53 year old woman who presents for an initial follow up for further evaluation of joint symptoms and rheumatic diseases including chronic low back pain, osteoarthritis, fibromyalgia, osteopenia, and Sjogren's Syndrome.  Pain "all over" including bilateral shoulders, elbows, wrists, fingers, hips, and knees secondary to osteoarthritis and fibromyalgia with sleep disturbance and fatigue. On exam patient has bilateral medial epicondylitis, tenderness left 1st CMC, bilateral trochanteric bursitis, and anserine bursitis all contributing to her recent joint pains. Denies joint swelling, erythema and heat. Persistent low back pain radiating to the left lower extremity to the toes secondary to osteoarthritis and chronic low back pain. Persistent left hip pain, with weightbearing and at rest secondary to osteoarthritis and trochanteric bursitis, consider contribution from her chronic low back pain. Exercises 4-5 times a week, for 30 minutes via walking. Mentions dry mouth, denies recent dry eyes, on exam has dry eyes and dry mouth, secondary to Sjogren's syndrome, using Pilocarpine every 2 days p.r.n., biotene mouthwash and oral hydration, with adequate relief. The patient continues calcium supplement 1000 mg at bedtime. Did not start vitamin D supplement.  On her own she takes unknown dose of OTC vitamin D3 once daily.  Recent laboratory tests results reveal minimally elevated rheumatoid factor 16, otherwise normal, with extensive discussion. Recent MRI results of the Left Hip reveal left gluteal tendinosis and peritendinitis, bilateral greater trochanteric bursitis, with extensive discussion. Patient denies rash or side effects with current medications.   Plan: I reviewed the patient's chart and previous records I reviewed her previous lab results with extensive discussion with the patient I reviewed her previous MRI hip with extensive discussion Laboratory tests ordered - see list below - with coordination of care X-rays ordered  see list below  with coordination of care  Diagnosis and prognosis discussed Continue current medications (other than those changed below) Stay off flurbiprofen  Sulindac 200 mg b.i.d. at end of breakfast and supper (Possible side effects explained including cardiovascular risk/MI/CVA)  Discontinue tizanidine to switch to alternative--she declined--she wants to continue 4 mg at bedtime (Possible side effects explained) Venlafaxine 37.5 mg q.d supper time if no better/side effects increase 75 mg q.d supper time (Possible side effects explained) Prophylactic aspirin 81 mg q.d. end of lunch (Possible side effects explained) Change calcium 500 mg 3 times daily at end of meals (Possible side effects explained) After I obtain these new lab results, will consider starting prescription vitamin D supplement Artificial tears one drop each eye q.i.d. and p.r.n.(Possible side effects explained) Biotene mouthwash/spray q.i.d. and p.r.n.(Possible side effects explained) Oral Hydration Physical therapy--- already ordered by orthopedics for her back and hips Continue Daily exercise at least 30 minutes per day - emphasized--extensive discussion Continues to declined sleep study Return visit 3 months All questions and concerns were addressed  Total time for this office visit, including face-to-face time and non-face-to-face time, 86 minutes--- including review of the chart and previous records, detailed review of her medical history, review of previous lab results with extensive discussion with the patient, ordering lab tests with coordination of care, review of previous imaging reports/x-ray results, review of her recent MRI hips with extensive discussion with the patient, ordering of new x-rays with coordination of care, detailed medication history, review of medications going forward with their possible side effects, multiple discussions regarding sleep study which she declined physical therapy which has been ordered by another physician orthopedics already, reviewed the impact of the patient's rheumatic disease on their other medical problems, reviewed the impact of the patient's other medical problems on their rheumatic disease

## 2023-12-16 NOTE — ADDENDUM
[FreeTextEntry1] : I, Marquise Roldana, acted solely as a scribe for Dr. Myron I. Kleiner, MD. on 12/13/2023 .

## 2023-12-29 ENCOUNTER — NON-APPOINTMENT (OUTPATIENT)
Age: 53
End: 2023-12-29

## 2024-03-11 ENCOUNTER — APPOINTMENT (OUTPATIENT)
Dept: RHEUMATOLOGY | Facility: CLINIC | Age: 54
End: 2024-03-11

## 2024-05-09 ENCOUNTER — TRANSCRIPTION ENCOUNTER (OUTPATIENT)
Age: 54
End: 2024-05-09

## 2024-05-09 RX ORDER — TIZANIDINE 4 MG/1
4 TABLET ORAL
Qty: 30 | Refills: 0 | Status: ACTIVE | COMMUNITY
Start: 2023-12-16 | End: 1900-01-01

## 2024-06-11 ENCOUNTER — APPOINTMENT (OUTPATIENT)
Dept: RHEUMATOLOGY | Facility: CLINIC | Age: 54
End: 2024-06-11